# Patient Record
Sex: MALE | Race: WHITE | NOT HISPANIC OR LATINO | Employment: STUDENT | URBAN - METROPOLITAN AREA
[De-identification: names, ages, dates, MRNs, and addresses within clinical notes are randomized per-mention and may not be internally consistent; named-entity substitution may affect disease eponyms.]

---
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
## 2024-10-06 ENCOUNTER — HOSPITAL ENCOUNTER (EMERGENCY)
Facility: HOSPITAL | Age: 19
Discharge: HOME | End: 2024-10-06
Attending: EMERGENCY MEDICINE
Payer: COMMERCIAL

## 2024-10-06 ENCOUNTER — APPOINTMENT (OUTPATIENT)
Dept: RADIOLOGY | Facility: HOSPITAL | Age: 19
End: 2024-10-06
Payer: COMMERCIAL

## 2024-10-06 VITALS
SYSTOLIC BLOOD PRESSURE: 142 MMHG | DIASTOLIC BLOOD PRESSURE: 76 MMHG | RESPIRATION RATE: 18 BRPM | TEMPERATURE: 99.8 F | HEART RATE: 79 BPM | OXYGEN SATURATION: 99 %

## 2024-10-06 DIAGNOSIS — S62.109A WRIST FRACTURE: Primary | ICD-10-CM

## 2024-10-06 DIAGNOSIS — S62.009A SCAPHOID FRACTURE: ICD-10-CM

## 2024-10-06 PROCEDURE — 29125 APPL SHORT ARM SPLINT STATIC: CPT | Performed by: EMERGENCY MEDICINE

## 2024-10-06 PROCEDURE — 99283 EMERGENCY DEPT VISIT LOW MDM: CPT

## 2024-10-06 PROCEDURE — 73110 X-RAY EXAM OF WRIST: CPT

## 2024-10-06 PROCEDURE — 99284 EMERGENCY DEPT VISIT MOD MDM: CPT | Performed by: EMERGENCY MEDICINE

## 2024-10-06 RX ORDER — ACETAMINOPHEN 325 MG/1
975 TABLET ORAL ONCE
Status: COMPLETED | OUTPATIENT
Start: 2024-10-06 | End: 2024-10-06

## 2024-10-06 RX ORDER — GINSENG 100 MG
1 CAPSULE ORAL ONCE
Status: COMPLETED | OUTPATIENT
Start: 2024-10-06 | End: 2024-10-06

## 2024-10-06 RX ADMIN — ACETAMINOPHEN 975 MG: 325 TABLET ORAL at 20:24

## 2024-10-06 RX ADMIN — BACITRACIN 1 SMALL APPLICATION: 500 OINTMENT TOPICAL at 20:41

## 2024-10-06 NOTE — Clinical Note
Korey Camarena was seen and treated in our emergency department on 10/6/2024.        No work until cleared by Family Doctor/Orthopedics        Diagnosis:     Korey  .    He may return on this date:          If you have any questions or concerns, please don't hesitate to call.      Thiago Swenson, DO    ______________________________           _______________          _______________  Hospital Representative                              Date                                Time

## 2024-10-07 ENCOUNTER — OFFICE VISIT (OUTPATIENT)
Dept: OBGYN CLINIC | Facility: CLINIC | Age: 19
End: 2024-10-07
Payer: COMMERCIAL

## 2024-10-07 VITALS — BODY MASS INDEX: 22.81 KG/M2 | WEIGHT: 150 LBS

## 2024-10-07 DIAGNOSIS — S52.501A CLOSED FRACTURE DISTAL RADIUS AND ULNA, RIGHT, INITIAL ENCOUNTER: ICD-10-CM

## 2024-10-07 DIAGNOSIS — S62.024A CLOSED NONDISPLACED FRACTURE OF MIDDLE THIRD OF SCAPHOID BONE OF RIGHT WRIST, INITIAL ENCOUNTER: ICD-10-CM

## 2024-10-07 DIAGNOSIS — S52.601A CLOSED FRACTURE DISTAL RADIUS AND ULNA, RIGHT, INITIAL ENCOUNTER: ICD-10-CM

## 2024-10-07 PROBLEM — S62.009A SCAPHOID FRACTURE: Status: ACTIVE | Noted: 2024-10-07

## 2024-10-07 PROBLEM — S62.109A WRIST FRACTURE: Status: ACTIVE | Noted: 2024-10-07

## 2024-10-07 PROCEDURE — 99203 OFFICE O/P NEW LOW 30 MIN: CPT | Performed by: SURGERY
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
## 2024-10-14 ENCOUNTER — OFFICE VISIT (OUTPATIENT)
Dept: OBGYN CLINIC | Facility: CLINIC | Age: 19
End: 2024-10-14
Payer: COMMERCIAL

## 2024-10-14 VITALS — BODY MASS INDEX: 22.73 KG/M2 | HEIGHT: 68 IN | WEIGHT: 150 LBS

## 2024-10-14 DIAGNOSIS — S52.502D FRACTURE OF DISTAL RADIUS AND ULNA, LEFT, CLOSED, WITH ROUTINE HEALING, SUBSEQUENT ENCOUNTER: ICD-10-CM

## 2024-10-14 DIAGNOSIS — S62.024D CLOSED NONDISPLACED FRACTURE OF MIDDLE THIRD OF SCAPHOID OF RIGHT WRIST WITH ROUTINE HEALING, SUBSEQUENT ENCOUNTER: ICD-10-CM

## 2024-10-14 DIAGNOSIS — M25.532 PAIN IN LEFT WRIST: Primary | ICD-10-CM

## 2024-10-14 DIAGNOSIS — S52.602D FRACTURE OF DISTAL RADIUS AND ULNA, LEFT, CLOSED, WITH ROUTINE HEALING, SUBSEQUENT ENCOUNTER: ICD-10-CM

## 2024-10-14 DIAGNOSIS — M25.531 PAIN IN RIGHT WRIST: ICD-10-CM

## 2024-10-14 PROCEDURE — 99214 OFFICE O/P EST MOD 30 MIN: CPT | Performed by: SURGERY

## 2024-10-14 RX ORDER — SODIUM CHLORIDE, SODIUM LACTATE, POTASSIUM CHLORIDE, CALCIUM CHLORIDE 600; 310; 30; 20 MG/100ML; MG/100ML; MG/100ML; MG/100ML
125 INJECTION, SOLUTION INTRAVENOUS CONTINUOUS
Status: CANCELLED | OUTPATIENT
Start: 2024-10-22

## 2024-10-14 RX ORDER — CEFAZOLIN SODIUM 2 G/50ML
2000 SOLUTION INTRAVENOUS ONCE
Status: CANCELLED | OUTPATIENT
Start: 2024-10-22 | End: 2024-10-14
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
## 2024-10-14 NOTE — H&P (VIEW-ONLY)
Hand Surgery History and Physical    10/14/24  11:45 AM  [id number]  Korey Camarena      HPI:  Pt is a 20 yo male with a right scaphoid waist fracture and left intraarticular distal radius fracture involving the radial styloid sustained on 10/6/24.  He has been continued with immobilization while thinking about his treatment options.  He notes some aching pain.  Denies fever/chills.     No past medical history on file.    No past surgical history on file.    No family history on file.    Review of Systems - General ROS: negative  Ophthalmic ROS: negative  ENT ROS: negative  Respiratory ROS: negative  Cardiovascular ROS: negative  Neurological ROS: negative  Dermatological ROS: negative    There were no vitals filed for this visit.    Physical Examination:  General:  NAD  HEENT:  EOMI, perrla, normal ear morphology  Chest:  Unlabored breathing  Heart:  Regular pulse  Abd:  No distension  Extrem: RUE:  ecchymoses of wrist, able to make a fist complex, tender on palpation of wrist, SILT  LUE:  ecchymoses over dorsal and volar wrist, mild edema, able to make a fist complex with effort, tender on palpation of wrist, SILT  Skin:  Dry, pink  Neuro:  AAOx3     Encounter Diagnoses   Name Primary?    Pain in left wrist Yes    Pain in right wrist     Fracture of distal radius and ulna, left, closed, with routine healing, subsequent encounter     Closed nondisplaced fracture of middle third of scaphoid of right wrist with routine healing, subsequent encounter      Return for OR; 2 week follow up.      A/P: Pt is a 20 yo male with a right scaphoid waist fracture and left intraarticular distal radius fracture involving the radial styloid sustained on 10/6/24.    -New plain films obtained and independently reviewed with patient:  Right scaphoid waist fracture without cystic degeneration.  Left distal radius fracture with volar displacement and intraarticular gapping.    -Discussed treatment options again. Recommend ORIF left distal  radius fracture.  Also discussed possible ORIF of right scaphoid fracture.  He would like to pursue ORIF left distal radius which is reasonable.   -Consent obtained.  -General anesthesia.   -Will schedule promptly.           '

## 2024-10-21 RX ORDER — ACETAMINOPHEN 500 MG
500 TABLET ORAL EVERY 6 HOURS PRN
COMMUNITY

## 2024-10-21 NOTE — PRE-PROCEDURE INSTRUCTIONS
Pre-Surgery Instructions:   Medication Instructions    acetaminophen (TYLENOL) 500 mg tablet Uses PRN- OK to take day of surgery   Medication instructions for day surgery reviewed. Please use only a sip of water to take your instructed medications. Avoid all over the counter vitamins, supplements and NSAIDS for one week prior to surgery per anesthesia guidelines. Tylenol is ok to take as needed.     You will receive a call one business day prior to surgery with an arrival time and hospital directions. If your surgery is scheduled on a Monday, the hospital will be calling you on the Friday prior to your surgery. If you have not heard from anyone by 8pm, please call the hospital supervisor through the hospital  at [phone]. ([place] [phone] or [place] [phone]).    Do not eat or drink anything after midnight the night before your surgery, including candy, mints, lifesavers, or chewing gum. Do not drink alcohol 24hrs before your surgery. Try not to smoke at least 24hrs before your surgery.       Follow the pre surgery showering instructions as listed in the “My Surgical Experience Booklet” or otherwise provided by your surgeon's office. Do not use a blade to shave the surgical area 1 week before surgery. It is okay to use a clean electric clippers up to 24 hours before surgery. Do not apply any lotions, creams, including makeup, cologne, deodorant, or perfumes after showering on the day of your surgery. Do not use dry shampoo, hair spray, hair gel, or any type of hair products.     No contact lenses, eye make-up, or artificial eyelashes. Remove nail polish, including gel polish, and any artificial, gel, or acrylic nails if possible. Remove all jewelry including rings and body piercing jewelry.     Wear causal clothing that is easy to take on and off. Consider your type of surgery.    Keep any valuables, jewelry, piercings at home. Please bring any specially ordered equipment (sling, braces) if  indicated.    Arrange for a responsible person to drive you to and from the hospital on the day of your surgery. Please confirm the visitor policy for the day of your procedure when you receive your phone call with an arrival time.     Call the surgeon's office with any new illnesses, exposures, or additional questions prior to surgery.    Please reference your “My Surgical Experience Booklet” for additional information to prepare for your upcoming surgery.

## 2024-10-22 ENCOUNTER — HOSPITAL ENCOUNTER (OUTPATIENT)
Facility: HOSPITAL | Age: 19
Setting detail: OUTPATIENT SURGERY
Discharge: HOME | End: 2024-10-22
Attending: SURGERY | Admitting: SURGERY
Payer: COMMERCIAL

## 2024-10-22 ENCOUNTER — ANESTHESIA EVENT (OUTPATIENT)
Dept: PERIOP | Facility: HOSPITAL | Age: 19
End: 2024-10-22
Payer: COMMERCIAL

## 2024-10-22 ENCOUNTER — APPOINTMENT (OUTPATIENT)
Dept: RADIOLOGY | Facility: HOSPITAL | Age: 19
End: 2024-10-22
Payer: COMMERCIAL

## 2024-10-22 ENCOUNTER — ANESTHESIA (OUTPATIENT)
Dept: PERIOP | Facility: HOSPITAL | Age: 19
End: 2024-10-22
Payer: COMMERCIAL

## 2024-10-22 VITALS
TEMPERATURE: 98.2 F | HEART RATE: 92 BPM | SYSTOLIC BLOOD PRESSURE: 127 MMHG | OXYGEN SATURATION: 97 % | RESPIRATION RATE: 16 BRPM | DIASTOLIC BLOOD PRESSURE: 72 MMHG

## 2024-10-22 DIAGNOSIS — S62.102A CLOSED FRACTURE OF LEFT WRIST, INITIAL ENCOUNTER: Primary | ICD-10-CM

## 2024-10-22 PROCEDURE — C1713 ANCHOR/SCREW BN/BN,TIS/BN: HCPCS | Performed by: SURGERY

## 2024-10-22 PROCEDURE — 25608 OPTX DST RD XART FX/EPI SEP2: CPT | Performed by: SURGERY

## 2024-10-22 PROCEDURE — 73100 X-RAY EXAM OF WRIST: CPT

## 2024-10-22 PROCEDURE — 25608 OPTX DST RD XART FX/EPI SEP2: CPT | Performed by: PHYSICIAN ASSISTANT

## 2024-10-22 DEVICE — 3.5MM X 14MM LOCKING HEXALOBE SCREW
Type: IMPLANTABLE DEVICE | Site: WRIST | Status: FUNCTIONAL
Brand: ACUMED

## 2024-10-22 DEVICE — 3.5MM X 12MM NON-LOCKING HEXALOBE SCREW
Type: IMPLANTABLE DEVICE | Site: WRIST | Status: FUNCTIONAL
Brand: ACUMED

## 2024-10-22 DEVICE — 2.3MM X 18MM LOCKING CORTICAL SCREW
Type: IMPLANTABLE DEVICE | Site: WRIST | Status: FUNCTIONAL
Brand: ACUMED

## 2024-10-22 DEVICE — 3.5MM X 14MM NON-LOCKING HEXALOBE SCREW
Type: IMPLANTABLE DEVICE | Site: WRIST | Status: FUNCTIONAL
Brand: ACUMED

## 2024-10-22 DEVICE — ACU-LOC® 2 VDR PLT, NARROW, L
Type: IMPLANTABLE DEVICE | Site: WRIST | Status: FUNCTIONAL
Brand: ACUMED

## 2024-10-22 DEVICE — 2.3MM X 20MM LOCKING CORTICAL SCREW
Type: IMPLANTABLE DEVICE | Site: WRIST | Status: FUNCTIONAL
Brand: ACUMED

## 2024-10-22 DEVICE — 2.3MM X 16MM LOCKING CORTICAL PEG
Type: IMPLANTABLE DEVICE | Site: WRIST | Status: FUNCTIONAL
Brand: ACUMED

## 2024-10-22 RX ORDER — MEPERIDINE HYDROCHLORIDE 25 MG/ML
12.5 INJECTION INTRAMUSCULAR; INTRAVENOUS; SUBCUTANEOUS
Status: DISCONTINUED | OUTPATIENT
Start: 2024-10-22 | End: 2024-10-22 | Stop reason: HOSPADM

## 2024-10-22 RX ORDER — ONDANSETRON 2 MG/ML
INJECTION INTRAMUSCULAR; INTRAVENOUS AS NEEDED
Status: DISCONTINUED | OUTPATIENT
Start: 2024-10-22 | End: 2024-10-22

## 2024-10-22 RX ORDER — DEXAMETHASONE SODIUM PHOSPHATE 10 MG/ML
INJECTION, SOLUTION INTRAMUSCULAR; INTRAVENOUS AS NEEDED
Status: DISCONTINUED | OUTPATIENT
Start: 2024-10-22 | End: 2024-10-22

## 2024-10-22 RX ORDER — MAGNESIUM HYDROXIDE 1200 MG/15ML
LIQUID ORAL AS NEEDED
Status: DISCONTINUED | OUTPATIENT
Start: 2024-10-22 | End: 2024-10-22 | Stop reason: HOSPADM

## 2024-10-22 RX ORDER — ACETAMINOPHEN 325 MG/1
650 TABLET ORAL EVERY 6 HOURS PRN
Status: DISCONTINUED | OUTPATIENT
Start: 2024-10-22 | End: 2024-10-22 | Stop reason: HOSPADM

## 2024-10-22 RX ORDER — MIDAZOLAM HYDROCHLORIDE 2 MG/2ML
INJECTION, SOLUTION INTRAMUSCULAR; INTRAVENOUS AS NEEDED
Status: DISCONTINUED | OUTPATIENT
Start: 2024-10-22 | End: 2024-10-22

## 2024-10-22 RX ORDER — LIDOCAINE HYDROCHLORIDE 10 MG/ML
INJECTION, SOLUTION EPIDURAL; INFILTRATION; INTRACAUDAL; PERINEURAL AS NEEDED
Status: DISCONTINUED | OUTPATIENT
Start: 2024-10-22 | End: 2024-10-22

## 2024-10-22 RX ORDER — CEFAZOLIN SODIUM 2 G/50ML
2000 SOLUTION INTRAVENOUS ONCE
Status: COMPLETED | OUTPATIENT
Start: 2024-10-22 | End: 2024-10-22

## 2024-10-22 RX ORDER — FENTANYL CITRATE 50 UG/ML
INJECTION, SOLUTION INTRAMUSCULAR; INTRAVENOUS AS NEEDED
Status: DISCONTINUED | OUTPATIENT
Start: 2024-10-22 | End: 2024-10-22

## 2024-10-22 RX ORDER — OXYCODONE HYDROCHLORIDE 10 MG/1
5 TABLET ORAL EVERY 4 HOURS PRN
Status: DISCONTINUED | OUTPATIENT
Start: 2024-10-22 | End: 2024-10-22 | Stop reason: HOSPADM

## 2024-10-22 RX ORDER — PROMETHAZINE HYDROCHLORIDE 25 MG/ML
25 INJECTION, SOLUTION INTRAMUSCULAR; INTRAVENOUS ONCE AS NEEDED
Status: DISCONTINUED | OUTPATIENT
Start: 2024-10-22 | End: 2024-10-22 | Stop reason: HOSPADM

## 2024-10-22 RX ORDER — FENTANYL CITRATE 50 UG/ML
25 INJECTION, SOLUTION INTRAMUSCULAR; INTRAVENOUS
Status: DISCONTINUED | OUTPATIENT
Start: 2024-10-22 | End: 2024-10-22 | Stop reason: HOSPADM

## 2024-10-22 RX ORDER — SODIUM CHLORIDE, SODIUM LACTATE, POTASSIUM CHLORIDE, CALCIUM CHLORIDE 600; 310; 30; 20 MG/100ML; MG/100ML; MG/100ML; MG/100ML
125 INJECTION, SOLUTION INTRAVENOUS CONTINUOUS
Status: DISCONTINUED | OUTPATIENT
Start: 2024-10-22 | End: 2024-10-22 | Stop reason: HOSPADM

## 2024-10-22 RX ORDER — BUPIVACAINE HYDROCHLORIDE 2.5 MG/ML
INJECTION, SOLUTION EPIDURAL; INFILTRATION; INTRACAUDAL AS NEEDED
Status: DISCONTINUED | OUTPATIENT
Start: 2024-10-22 | End: 2024-10-22 | Stop reason: HOSPADM

## 2024-10-22 RX ORDER — PROPOFOL 10 MG/ML
INJECTION, EMULSION INTRAVENOUS AS NEEDED
Status: DISCONTINUED | OUTPATIENT
Start: 2024-10-22 | End: 2024-10-22

## 2024-10-22 RX ORDER — NAPROXEN 500 MG/1
500 TABLET ORAL 2 TIMES DAILY WITH MEALS
Qty: 10 TABLET | Refills: 0 | Status: SHIPPED | OUTPATIENT
Start: 2024-10-22 | End: 2024-10-27

## 2024-10-22 RX ORDER — OXYCODONE AND ACETAMINOPHEN 5; 325 MG/1; MG/1
1 TABLET ORAL EVERY 12 HOURS PRN
Qty: 10 TABLET | Refills: 0 | Status: SHIPPED | OUTPATIENT
Start: 2024-10-22

## 2024-10-22 RX ADMIN — SODIUM CHLORIDE, SODIUM LACTATE, POTASSIUM CHLORIDE, AND CALCIUM CHLORIDE: .6; .31; .03; .02 INJECTION, SOLUTION INTRAVENOUS at 09:30

## 2024-10-22 RX ADMIN — MIDAZOLAM 2 MG: 1 INJECTION INTRAMUSCULAR; INTRAVENOUS at 09:29

## 2024-10-22 RX ADMIN — FENTANYL CITRATE 50 MCG: 50 INJECTION, SOLUTION INTRAMUSCULAR; INTRAVENOUS at 09:36

## 2024-10-22 RX ADMIN — LIDOCAINE HYDROCHLORIDE 50 MG: 10 SOLUTION INTRAVENOUS at 09:34

## 2024-10-22 RX ADMIN — PROPOFOL 200 MG: 10 INJECTION, EMULSION INTRAVENOUS at 09:34

## 2024-10-22 RX ADMIN — DEXAMETHASONE SODIUM PHOSPHATE 10 MG: 10 INJECTION, SOLUTION INTRAMUSCULAR; INTRAVENOUS at 09:35

## 2024-10-22 RX ADMIN — FENTANYL CITRATE 50 MCG: 50 INJECTION, SOLUTION INTRAMUSCULAR; INTRAVENOUS at 11:25

## 2024-10-22 RX ADMIN — FENTANYL CITRATE 50 MCG: 50 INJECTION, SOLUTION INTRAMUSCULAR; INTRAVENOUS at 11:16

## 2024-10-22 RX ADMIN — ONDANSETRON 4 MG: 2 INJECTION INTRAMUSCULAR; INTRAVENOUS at 09:35

## 2024-10-22 RX ADMIN — FENTANYL CITRATE 50 MCG: 50 INJECTION, SOLUTION INTRAMUSCULAR; INTRAVENOUS at 09:38

## 2024-10-22 RX ADMIN — CEFAZOLIN SODIUM 2000 MG: 2 SOLUTION INTRAVENOUS at 09:29

## 2024-10-22 NOTE — ANESTHESIA POSTPROCEDURE EVALUATION
Post-Op Assessment Note    CV Status:  Stable  Pain Score: 0    Pain management: adequate       Mental Status:  Sleepy   Hydration Status:  Euvolemic   PONV Controlled:  Controlled   Airway Patency:  Patent     Post Op Vitals Reviewed: Yes    No anethesia notable event occurred.    Staff: CRNA           Last Filed PACU Vitals:  Vitals Value Taken Time   Temp 99.2    Pulse 82 10/22/24 1129   /66    Resp 16    SpO2 100 % 10/22/24 1129   Vitals shown include unfiled device data.    Modified Meena:  No data recorded

## 2024-10-22 NOTE — OP NOTE
OPERATIVE REPORT  PATIENT NAME: Korey Camarena    :  2005  MRN: [id number]  Pt Location:  OR ROOM 11    SURGERY DATE: 10/22/2024    Surgeons and Role:     * Mor Rivera MD - Primary     * Mart Vincent PA-C - Assisting    Preop Diagnosis:  Fracture of distal radius and ulna, left, closed, with routine healing, subsequent encounter [S52.502D, S52.602D]    Post-Op Diagnosis Codes:     * Fracture of distal radius and ulna, left, closed, with routine healing, subsequent encounter [S52.502D, S52.602D]    Procedure(s):  Left - OPEN REDUCTION W/ INTERNAL FIXATION (ORIF) RADIUS (WRIST), INTRAARTICULAR WITH 2 FRAGMENTS    Specimen(s):  * No specimens in log *    Estimated Blood Loss:   Minimal    Drains:  * No LDAs found *    Anesthesia Type:   General/LMA    Operative Indications:  Fracture of distal radius and ulna, left, closed, with routine healing, subsequent encounter [S52.502D, S52.602D]      Operative Findings:  Acceptable alignment and hardware placement after ORIF      Complications:   None    Procedure and Technique:  The left upper extremities prepped and draped in a sterile fashion after placement of an upper arm tourniquet.  An Esmarch was used to exsanguinate the left upper extremity and the tourniquet was insufflated to 250 mmHg pressure.  With the fracture pattern a decision was made to try to perform a radial dorsal approach with a fragment specific plate.  An incision was made between the first and second dorsal compartments overlying the distal radius.  Blood section performed through subtenons tissues and the dorsal radial sensory nerve was identified and mobilized and protected.  An incision was made in the interval between the first and second dorsal compartments and the periosteum was elevated off of the radius.  The fracture plane was identified but there was noted to be a fair amount of healing already in place.  A freer elevator was used to enter this plane and mobilization was  initiated.  C-arm fluoroscopy was then used to evaluate the mobility of the fragment as well as ability to restore proper anatomic alignment.  These images were independently reviewed throughout the procedure.  Ultimately adequate mobilization could not be achieved as far as restoring the volar translation of the fragment.  Decision was then made to make a standard volar Keanu approach to the distal radius to facilitate mobilization.  Incision was made with a 15 blade scalpel and the FCR tendon was identified and retracted in an ulnar direction with the underlying fascia incised to allow mobilization of the FPL muscle belly in an ulnar direction.  The pronator quadratus was then incised along its radial insertion and elevated off of the distal radius.  There was significant healing to the radius and this had to be mobilized again with a [place] elevator.  Once this plane was established in a longitudinal plane extending up to the interval between the scaphoid and lunate facets was also mobilized with a [place].  This then allowed for adequate correction of the volar translation of the fragment as well as regaining the shortening that had also occurred.  A K wire was placed through the radial styloid and the incision that we had already made in order to maintain reduction while volar plate fixation was performed.  C-arm fluoroscopy was again used to confirm adequate placement of this hardware as well as the orientation of the volar distal radius plate which was affixed temporarily in place with K wires.  3.5 millimeter screw was then placed through the oblong hole followed by distal fixation of the radial styloid component of the fracture.  C-arm fluoroscopy was again used to confirm adequate placement as well as maintenance of acceptable alignment.  The ulnar screws were not placed as these were not necessary for fixation of this fracture pattern.  The temporary K wires were then removed and final images obtained.   Satisfactory over alignment was achieved including the additional proximal screws which were placed at the end of the procedure.  The wounds were then copiously irrigated with normal saline.  The skin was approximated with 3 dermal Vicryl followed by 4-0 Monocryl in a running subcuticular fashion at both sites.  Field blocks and periosteal blocks were then performed using Marcaine 0.25% plain.  4 4 gauze was placed along the incision sites followed by Webril overwrap.  A 3 inch volar Ortho-Glass splint was then fabricated with Ace bandage overwrapped.  The tourniquet was released and the patient was extubated and transferred to recovery in stable condition.   I was present for the entire procedure.    Patient Disposition:  PACU     My Assistant was necessary throughout the procedure(s) for retraction and positioning.    I understand that section 1842 (b)(7)(D) of the Social Security Act generally prohibits Medicare physician fee schedule payment for the services of assistants-at-surgery in teaching hospitals when qualified residents are available to furnish such services. I certify that the services for which payment is claimed were medically necessary, and that no qualified resident was available to perform the services. I further understand that these services are subject to post-payment review by the Medicare carrier.           SIGNATURE: Mor Rivera MD  DATE: October 22, 2024  TIME: 11:30 AM

## 2024-10-22 NOTE — ANESTHESIA PREPROCEDURE EVALUATION
Procedure:  OPEN REDUCTION W/ INTERNAL FIXATION (ORIF) RADIUS (WRIST) CPT: 98179 (Left: Wrist)    Relevant Problems   No relevant active problems        Physical Exam    Airway    Mallampati score: II  TM Distance: >3 FB  Neck ROM: full     Dental   No notable dental hx     Cardiovascular  Cardiovascular exam normal    Pulmonary  Pulmonary exam normal     Other Findings        Anesthesia Plan  ASA Score- 1     Anesthesia Type- general with ASA Monitors.         Additional Monitors:     Airway Plan: ETT and LMA.    Comment: No labs.       Plan Factors-Exercise tolerance (METS): >4 METS.    Chart reviewed.        Patient is not a current smoker. Patient not instructed to abstain from smoking on day of procedure. Patient did not smoke on day of surgery.            Induction- intravenous.    Postoperative Plan-         Informed Consent- Anesthetic plan and risks discussed with patient.  I personally reviewed this patient with the CRNA. Discussed and agreed on the Anesthesia Plan with the CRNA..

## 2024-10-22 NOTE — ANESTHESIA POSTPROCEDURE EVALUATION
Post-Op Assessment Note    CV Status:  Stable  Pain Score: 0    Pain management: adequate       Mental Status:  Alert and awake   Hydration Status:  Stable   PONV Controlled:  None   Airway Patency:  Patent     Post Op Vitals Reviewed: Yes    No anethesia notable event occurred.    Staff: Anesthesiologist, with CRNAs           Last Filed PACU Vitals:  Vitals Value Taken Time   Temp 99.2 °F (37.3 °C) 10/22/24 1127   Pulse 94 10/22/24 1150   /64 10/22/24 1150   Resp 16 10/22/24 1150   SpO2 96 % 10/22/24 1150       Modified Meena:  Activity: 2 (10/22/2024 11:50 AM)  Respiration: 2 (10/22/2024 11:50 AM)  Circulation: 2 (10/22/2024 11:50 AM)  Consciousness: 2 (10/22/2024 11:50 AM)  Oxygen Saturation: 2 (10/22/2024 11:50 AM)  Modified Meena Score: 10 (10/22/2024 11:50 AM)

## 2024-10-22 NOTE — DISCHARGE INSTR - AVS FIRST PAGE
Special Instructions:  Rest left hand and elevate frequently.  Do not remove splint. All dressings/bandages to remain intact until your follow-up appointment.  May shower with protective cover on left hand.  Do not submerge.  No driving.  Follow-up in the office per your scheduled post-op appointment 10/31/2024.

## 2024-10-22 NOTE — INTERVAL H&P NOTE
H&P reviewed. After examining the patient I find no changes in the patients condition since the H&P had been written.    Vitals:    10/22/24 0849   BP: 143/98   Pulse: 75   Resp: 18   Temp: (!) 97.2 °F (36.2 °C)   SpO2: 100%

## 2024-10-31 ENCOUNTER — OFFICE VISIT (OUTPATIENT)
Dept: OBGYN CLINIC | Facility: CLINIC | Age: 19
End: 2024-10-31

## 2024-10-31 VITALS
BODY MASS INDEX: 22.73 KG/M2 | HEIGHT: 68 IN | DIASTOLIC BLOOD PRESSURE: 63 MMHG | WEIGHT: 150 LBS | SYSTOLIC BLOOD PRESSURE: 120 MMHG

## 2024-10-31 DIAGNOSIS — S62.024D CLOSED NONDISPLACED FRACTURE OF MIDDLE THIRD OF SCAPHOID OF RIGHT WRIST WITH ROUTINE HEALING, SUBSEQUENT ENCOUNTER: ICD-10-CM

## 2024-10-31 DIAGNOSIS — S52.602D FRACTURE OF DISTAL RADIUS AND ULNA, LEFT, CLOSED, WITH ROUTINE HEALING, SUBSEQUENT ENCOUNTER: Primary | ICD-10-CM

## 2024-10-31 DIAGNOSIS — S52.502D FRACTURE OF DISTAL RADIUS AND ULNA, LEFT, CLOSED, WITH ROUTINE HEALING, SUBSEQUENT ENCOUNTER: Primary | ICD-10-CM

## 2024-10-31 PROCEDURE — 99024 POSTOP FOLLOW-UP VISIT: CPT | Performed by: SURGERY

## 2024-10-31 NOTE — PROGRESS NOTES
HPI:  19M here for first post-op visit.  He is s/p ORIF L distal radius fracture 10/22/2024.  Today he states overall he is doing well.  He has minimal pain in the left wrist.  He is no longer taking over-the-counter pain medications.  No associated numbness and tingling.  Has been compliant with splinting and restrictions.  No fever or chills.      PE:  Left wrist: Volar and radial based incisions are both clean dry intact without signs of infection.  + Ecchymosis and swelling about the wrist and hand.  Good finger motion.  Wrist motion and strength testing deferred.  Palpable radial pulse      A/P:  S/p ORIF L distal radius fracture 10/22/2024.  Also with right scaphoid fracture.  Doing well.  -NWLAURENT BLANCOE, can now transition to Velcro style wrist brace that can be removed for hygiene only.    -Start gentle active motion left wrist, exercises shown today.    -Remain NWB RUE in Velcro wrist brace as well.  -F/U 2 weeks for re-evaluation and x-rays of both wrists.  -All questions answered.

## 2024-11-13 ENCOUNTER — OFFICE VISIT (OUTPATIENT)
Dept: OBGYN CLINIC | Facility: CLINIC | Age: 19
End: 2024-11-13

## 2024-11-13 VITALS
SYSTOLIC BLOOD PRESSURE: 114 MMHG | DIASTOLIC BLOOD PRESSURE: 62 MMHG | WEIGHT: 150 LBS | BODY MASS INDEX: 22.73 KG/M2 | HEIGHT: 68 IN

## 2024-11-13 DIAGNOSIS — S52.502D FRACTURE OF DISTAL RADIUS AND ULNA, LEFT, CLOSED, WITH ROUTINE HEALING, SUBSEQUENT ENCOUNTER: ICD-10-CM

## 2024-11-13 DIAGNOSIS — S52.602D FRACTURE OF DISTAL RADIUS AND ULNA, LEFT, CLOSED, WITH ROUTINE HEALING, SUBSEQUENT ENCOUNTER: ICD-10-CM

## 2024-11-13 DIAGNOSIS — M25.531 PAIN IN RIGHT WRIST: ICD-10-CM

## 2024-11-13 DIAGNOSIS — M25.532 PAIN IN LEFT WRIST: Primary | ICD-10-CM

## 2024-11-13 DIAGNOSIS — S62.024D CLOSED NONDISPLACED FRACTURE OF MIDDLE THIRD OF SCAPHOID OF RIGHT WRIST WITH ROUTINE HEALING, SUBSEQUENT ENCOUNTER: ICD-10-CM

## 2024-11-13 PROCEDURE — 99024 POSTOP FOLLOW-UP VISIT: CPT | Performed by: SURGERY

## 2024-11-13 NOTE — PROGRESS NOTES
HPI:  19M here for post-op visit.  He is s/p ORIF L distal radius fracture 10/22/2024.  Today he states overall he is doing well.  Has been compliant with braces on both wrists.  Has been doing motion exercises out of the wrist brace on the left side.  Offers no acute complaints.    PE:  Left wrist: Volar and radial based incisions are healed.    Wrist swelling is improving.  ROM:  20 flexion, 40 extension, full supination.  Strength deferred.  SILT m/r/u nerve distribution.     Palpable radial pulse  Right wrist:  No swelling.  Mild tenderness scaphoid tubercle only.  Nontender anatomic snuffbox.  Normal wrist range of motion.  Strength deferred.  Sensation intact to light touch median radial ulnar nerve distribution.  Palpable radial pulse.      A/P:  S/p ORIF L distal radius fracture 10/22/2024.  Also with right scaphoid fracture.    -X-rays today in the office demonstrate stable hardware and healing fractures.  -Remain NWB B/L UE in Velcro wrist braces.  -Can now be more aggressive with home active and passive motion exercises that were demonstrated today for both sides.  -F/U 3 weeks for re-evaluation.

## 2024-12-04 ENCOUNTER — OFFICE VISIT (OUTPATIENT)
Dept: OBGYN CLINIC | Facility: CLINIC | Age: 19
End: 2024-12-04

## 2024-12-04 VITALS
WEIGHT: 150 LBS | BODY MASS INDEX: 22.73 KG/M2 | SYSTOLIC BLOOD PRESSURE: 122 MMHG | HEIGHT: 68 IN | DIASTOLIC BLOOD PRESSURE: 80 MMHG

## 2024-12-04 DIAGNOSIS — S62.024D CLOSED NONDISPLACED FRACTURE OF MIDDLE THIRD OF SCAPHOID OF RIGHT WRIST WITH ROUTINE HEALING, SUBSEQUENT ENCOUNTER: Primary | ICD-10-CM

## 2024-12-04 DIAGNOSIS — S52.502D FRACTURE OF DISTAL RADIUS AND ULNA, LEFT, CLOSED, WITH ROUTINE HEALING, SUBSEQUENT ENCOUNTER: ICD-10-CM

## 2024-12-04 DIAGNOSIS — S52.602D FRACTURE OF DISTAL RADIUS AND ULNA, LEFT, CLOSED, WITH ROUTINE HEALING, SUBSEQUENT ENCOUNTER: ICD-10-CM

## 2024-12-04 PROCEDURE — 99024 POSTOP FOLLOW-UP VISIT: CPT | Performed by: SURGERY

## 2024-12-04 NOTE — PROGRESS NOTES
HPI:  19M here for post-op visit.  He is s/p ORIF L distal radius fracture 10/22/2024.  Right scaphoid fracture on 10/6/24.  Today he states overall he is doing well. No complaints with. He still wears the splint for sleep and occasionally for activities. He denies any numbness    PE:  Left wrist: Volar and radial based incisions are healed.   Minimal swelling noted    12/4/2024 Left wrist ROM: Flex 40 deg, 58 deg extension  Right wrist ROM: 36 deg flexion, 66 deg extension   11/13/2024 Left wrist ROM:  20 flexion, 40 extension, full supination.  Strength deferred.  SILT m/r/u nerve distribution.     Palpable radial pulse    Right wrist:    Sensation intact to light touch median radial ulnar nerve distribution.  Palpable radial pulse.      A/P:  S/p ORIF L distal radius fracture 10/22/2024.  Also with right scaphoid fracture.    - Doing well.  -ROM improving of Left wrist  -May start increased activities with both hands  -NSAIDs as needed  -F/U 3 weeks for re-evaluation. Right wrist XR at next visit with scaphoid view

## 2024-12-23 ENCOUNTER — OFFICE VISIT (OUTPATIENT)
Dept: OBGYN CLINIC | Facility: CLINIC | Age: 19
End: 2024-12-23

## 2024-12-23 DIAGNOSIS — S52.502D FRACTURE OF DISTAL RADIUS AND ULNA, LEFT, CLOSED, WITH ROUTINE HEALING, SUBSEQUENT ENCOUNTER: ICD-10-CM

## 2024-12-23 DIAGNOSIS — S52.602D FRACTURE OF DISTAL RADIUS AND ULNA, LEFT, CLOSED, WITH ROUTINE HEALING, SUBSEQUENT ENCOUNTER: ICD-10-CM

## 2024-12-23 DIAGNOSIS — S62.024D CLOSED NONDISPLACED FRACTURE OF MIDDLE THIRD OF SCAPHOID OF RIGHT WRIST WITH ROUTINE HEALING, SUBSEQUENT ENCOUNTER: Primary | ICD-10-CM

## 2024-12-23 PROCEDURE — 99024 POSTOP FOLLOW-UP VISIT: CPT | Performed by: SURGERY

## 2024-12-23 NOTE — PROGRESS NOTES
HPI:  19M here for post-op visit.  He is s/p ORIF L distal radius fracture 10/22/2024.  Right scaphoid fracture on 10/6/24.  Today he states he continues to do well. No complaints with either wrist    PE:  Left wrist: Volar and radial based incisions are healed    12/23/2024 Left wrist ROM: 42 deg flexion; 65 deg extension  12/4/2024 Left wrist ROM: Flex 40 deg, 58 deg extension  Right wrist ROM: 36 deg flexion, 66 deg extension   11/13/2024 Left wrist ROM:  20 flexion, 40 extension, full supination.    Strength deferred.  SILT m/r/u nerve distribution.    Palpable radial pulse    Right wrist:    Sensation intact to light touch median radial ulnar nerve distribution.  Palpable radial pulse.  AROM wrist: 45 deg flexion; 70 deg extension    New plain films obtained and  independently reviewed. Scaphoid fracture healed       A/P:  S/p ORIF L distal radius fracture 10/22/2024.  Also with right scaphoid fracture.    - Doing well.  - Xr Right wrist reviewed. Scaphoid fracture healed, no sclerosis  - Maybe as active as he can tolerated with either hand  - NSAIDs as needed  - F/U PRN

## 2025-02-14 ENCOUNTER — TELEPHONE (OUTPATIENT)
Dept: OBGYN CLINIC | Facility: CLINIC | Age: 20
End: 2025-02-14

## 2025-02-14 NOTE — TELEPHONE ENCOUNTER
Attempted to contact PCP again concerning extending the approval for services for [facility] to October 7th.  Received message stating office was closed after someone hung up on me the first time I tried to call.    2/14 LB

## (undated) DEVICE — EXOFIN PRECISION PEN HIGH VISCOSITY TOPICAL SKIN ADHESIVE: Brand: EXOFIN PRECISION PEN, 1G

## (undated) DEVICE — 2.8 MM X 5 IN QUICK RELEASE DRILL: Brand: ACUMED

## (undated) DEVICE — STERILE POLYISOPRENE POWDER-FREE SURGICAL GLOVES: Brand: PROTEXIS

## (undated) DEVICE — CUFF TOURNIQUET 18 X 4 IN QUICK CONNECT DISP 1 BLADDER

## (undated) DEVICE — STERILE POLYISOPRENE POWDER-FREE SURGICAL GLOVES WITH EMOLLIENT COATING: Brand: PROTEXIS

## (undated) DEVICE — DISPOSABLE OR TOWEL: Brand: CARDINAL HEALTH

## (undated) DEVICE — CAST CART BUCKET

## (undated) DEVICE — MAYO STAND COVER: Brand: CONVERTORS

## (undated) DEVICE — NEEDLE 23G X 1 1/2 SAFETY-GLIDE THIN WALL

## (undated) DEVICE — ACE WRAP 3 IN STERILE

## (undated) DEVICE — NEEDLE BLUNT 18 G X 1 1/2IN

## (undated) DEVICE — C-ARM: Brand: UNBRANDED

## (undated) DEVICE — SUT MONOCRYL 4-0 PS-2 27 IN Y426H

## (undated) DEVICE — PADDING CAST 4 IN  COTTON STRL

## (undated) DEVICE — SURGICAL CLIPPER BLADE GENERAL USE

## (undated) DEVICE — 3M™ STERI-STRIP™ REINFORCED ADHESIVE SKIN CLOSURES, R1547, 1/2 IN X 4 IN (12 MM X 100 MM), 6 STRIPS/ENVELOPE: Brand: 3M™ STERI-STRIP™

## (undated) DEVICE — .054" X 6" GUIDE WIRE
Type: IMPLANTABLE DEVICE | Site: WRIST | Status: NON-FUNCTIONAL
Brand: ACUMED
Removed: 2024-10-22

## (undated) DEVICE — CAST PADDING 4 IN SYNTHETIC NON-STRL

## (undated) DEVICE — ANTIBACTERIAL UNDYED BRAIDED (POLYGLACTIN 910), SYNTHETIC ABSORBABLE SUTURE: Brand: COATED VICRYL

## (undated) DEVICE — GAUZE SPONGES,16 PLY: Brand: CURITY

## (undated) DEVICE — SCD SEQUENTIAL COMPRESSION COMFORT SLEEVE MEDIUM KNEE LENGTH: Brand: KENDALL SCD

## (undated) DEVICE — SPLINT 3 IN X 15 FT DYNACAST S

## (undated) DEVICE — INTENDED FOR TISSUE SEPARATION, AND OTHER PROCEDURES THAT REQUIRE A SHARP SURGICAL BLADE TO PUNCTURE OR CUT.: Brand: BARD-PARKER ® SAFETYLOCK CARBON RIB-BACK BLADES

## (undated) DEVICE — SYRINGE 30ML LL

## (undated) DEVICE — STERILE BETHLEHEM PLASTIC HAND: Brand: CARDINAL HEALTH

## (undated) DEVICE — 2.0MM QUICK RELEASE DRILL: Brand: ACUMED